# Patient Record
Sex: MALE | URBAN - METROPOLITAN AREA
[De-identification: names, ages, dates, MRNs, and addresses within clinical notes are randomized per-mention and may not be internally consistent; named-entity substitution may affect disease eponyms.]

---

## 2023-02-10 ENCOUNTER — HOSPITAL ENCOUNTER (EMERGENCY)
Age: 28
Discharge: STILL ADMITTED OR ADMINISTRATIVE | End: 2023-02-10
Attending: STUDENT IN AN ORGANIZED HEALTH CARE EDUCATION/TRAINING PROGRAM

## 2023-02-10 VITALS
OXYGEN SATURATION: 99 % | BODY MASS INDEX: 46.77 KG/M2 | DIASTOLIC BLOOD PRESSURE: 89 MMHG | RESPIRATION RATE: 18 BRPM | HEIGHT: 67 IN | SYSTOLIC BLOOD PRESSURE: 155 MMHG | TEMPERATURE: 98.1 F | HEART RATE: 100 BPM | WEIGHT: 298 LBS

## 2023-02-10 NOTE — ED TRIAGE NOTES
Patient presented to triage and when asked what brought patient in he was speaking in random words, when asked what he was speaking or language he was speaking to be able to better understand how I could he stated [place]. Patient stated that he had been sleeping in the hospital for 80 days and could not go back home because it was not where he wanted to be, stated he wants to go to a group home.  Denies SI/HI